# Patient Record
Sex: MALE | Race: WHITE | URBAN - METROPOLITAN AREA
[De-identification: names, ages, dates, MRNs, and addresses within clinical notes are randomized per-mention and may not be internally consistent; named-entity substitution may affect disease eponyms.]

---

## 2019-09-30 ENCOUNTER — OFFICE VISIT (OUTPATIENT)
Dept: PRIMARY CARE CLINIC | Age: 64
End: 2019-09-30
Payer: COMMERCIAL

## 2019-09-30 VITALS
HEART RATE: 60 BPM | SYSTOLIC BLOOD PRESSURE: 132 MMHG | DIASTOLIC BLOOD PRESSURE: 86 MMHG | BODY MASS INDEX: 23.88 KG/M2 | WEIGHT: 181 LBS

## 2019-09-30 DIAGNOSIS — J30.89 ENVIRONMENTAL AND SEASONAL ALLERGIES: Chronic | ICD-10-CM

## 2019-09-30 DIAGNOSIS — Z12.5 SCREENING FOR PROSTATE CANCER: Primary | ICD-10-CM

## 2019-09-30 DIAGNOSIS — E78.00 PURE HYPERCHOLESTEROLEMIA: ICD-10-CM

## 2019-09-30 DIAGNOSIS — Z12.5 SCREENING FOR PROSTATE CANCER: ICD-10-CM

## 2019-09-30 LAB
A/G RATIO: 2.5 (ref 1.1–2.2)
ALBUMIN SERPL-MCNC: 5 G/DL (ref 3.4–5)
ALP BLD-CCNC: 64 U/L (ref 40–129)
ALT SERPL-CCNC: 21 U/L (ref 10–40)
ANION GAP SERPL CALCULATED.3IONS-SCNC: 14 MMOL/L (ref 3–16)
AST SERPL-CCNC: 20 U/L (ref 15–37)
BASOPHILS ABSOLUTE: 0.1 K/UL (ref 0–0.2)
BASOPHILS RELATIVE PERCENT: 1.2 %
BILIRUB SERPL-MCNC: 0.6 MG/DL (ref 0–1)
BUN BLDV-MCNC: 18 MG/DL (ref 7–20)
CALCIUM SERPL-MCNC: 9.4 MG/DL (ref 8.3–10.6)
CHLORIDE BLD-SCNC: 104 MMOL/L (ref 99–110)
CHOLESTEROL, TOTAL: 261 MG/DL (ref 0–199)
CO2: 25 MMOL/L (ref 21–32)
CREAT SERPL-MCNC: 1.1 MG/DL (ref 0.8–1.3)
EOSINOPHILS ABSOLUTE: 0.1 K/UL (ref 0–0.6)
EOSINOPHILS RELATIVE PERCENT: 1.9 %
GFR AFRICAN AMERICAN: >60
GFR NON-AFRICAN AMERICAN: >60
GLOBULIN: 2 G/DL
GLUCOSE BLD-MCNC: 89 MG/DL (ref 70–99)
HCT VFR BLD CALC: 44.9 % (ref 40.5–52.5)
HDLC SERPL-MCNC: 67 MG/DL (ref 40–60)
HEMOGLOBIN: 14.8 G/DL (ref 13.5–17.5)
LDL CHOLESTEROL CALCULATED: 178 MG/DL
LYMPHOCYTES ABSOLUTE: 2.1 K/UL (ref 1–5.1)
LYMPHOCYTES RELATIVE PERCENT: 33 %
MCH RBC QN AUTO: 27.9 PG (ref 26–34)
MCHC RBC AUTO-ENTMCNC: 32.9 G/DL (ref 31–36)
MCV RBC AUTO: 84.8 FL (ref 80–100)
MONOCYTES ABSOLUTE: 0.5 K/UL (ref 0–1.3)
MONOCYTES RELATIVE PERCENT: 7.3 %
NEUTROPHILS ABSOLUTE: 3.6 K/UL (ref 1.7–7.7)
NEUTROPHILS RELATIVE PERCENT: 56.6 %
PDW BLD-RTO: 16.6 % (ref 12.4–15.4)
PLATELET # BLD: 224 K/UL (ref 135–450)
PMV BLD AUTO: 9.1 FL (ref 5–10.5)
POTASSIUM SERPL-SCNC: 4.2 MMOL/L (ref 3.5–5.1)
PROSTATE SPECIFIC ANTIGEN: 0.67 NG/ML (ref 0–4)
RBC # BLD: 5.29 M/UL (ref 4.2–5.9)
SODIUM BLD-SCNC: 143 MMOL/L (ref 136–145)
TOTAL PROTEIN: 7 G/DL (ref 6.4–8.2)
TRIGL SERPL-MCNC: 80 MG/DL (ref 0–150)
TSH SERPL DL<=0.05 MIU/L-ACNC: 2.57 UIU/ML (ref 0.27–4.2)
VLDLC SERPL CALC-MCNC: 16 MG/DL
WBC # BLD: 6.3 K/UL (ref 4–11)

## 2019-09-30 PROCEDURE — 99203 OFFICE O/P NEW LOW 30 MIN: CPT | Performed by: INTERNAL MEDICINE

## 2019-09-30 PROCEDURE — G8420 CALC BMI NORM PARAMETERS: HCPCS | Performed by: INTERNAL MEDICINE

## 2019-09-30 PROCEDURE — 1036F TOBACCO NON-USER: CPT | Performed by: INTERNAL MEDICINE

## 2019-09-30 PROCEDURE — G8427 DOCREV CUR MEDS BY ELIG CLIN: HCPCS | Performed by: INTERNAL MEDICINE

## 2019-09-30 PROCEDURE — 3017F COLORECTAL CA SCREEN DOC REV: CPT | Performed by: INTERNAL MEDICINE

## 2019-09-30 ASSESSMENT — PATIENT HEALTH QUESTIONNAIRE - PHQ9
2. FEELING DOWN, DEPRESSED OR HOPELESS: 0
SUM OF ALL RESPONSES TO PHQ9 QUESTIONS 1 & 2: 0
1. LITTLE INTEREST OR PLEASURE IN DOING THINGS: 0
SUM OF ALL RESPONSES TO PHQ QUESTIONS 1-9: 0
SUM OF ALL RESPONSES TO PHQ QUESTIONS 1-9: 0

## 2019-09-30 NOTE — PROGRESS NOTES
Activity    Alcohol use: Yes     Comment: two drinks a month    Drug use: No    Sexual activity: Not Currently     Partners: Female     Comment:    Lifestyle    Physical activity:     Days per week: Not on file     Minutes per session: Not on file    Stress: Not on file   Relationships    Social connections:     Talks on phone: Not on file     Gets together: Not on file     Attends L[employer] day service: Not on file     Active member of club or organization: Not on file     Attends meetings of clubs or organizations: Not on file     Relationship status: Not on file    Intimate partner violence:     Fear of current or ex partner: Not on file     Emotionally abused: Not on file     Physically abused: Not on file     Forced sexual activity: Not on file   Other Topics Concern    Not on file   Social History Narrative    Not on file       Family History   Problem Relation Age of Onset    Diabetes Mother     Arthritis Mother         spinal stenosis    Heart Disease Mother         pacemaker    Cancer Maternal Grandfather         lung    Cancer Brother         throat, hpv related    Early Death Maternal Uncle     Heart Disease Maternal Uncle     Cancer Paternal Uncle         prostate    Cancer Paternal Cousin         breast        Review of Systems  ROS: No unusual headaches or allergy symptoms or blurred vision. No prolonged cough. No flushing or facial pain or chest pain,dizziness, dyspnea, palpitations, or chest pain on exertion. No syncope. No nausea or vommitting or diarrhea. No jaundice or abdominal pain, change in bowel habits, black or bloody stools. No dysuria or hematuria or frequency of urination. No myalgias or muscle pain. No numbness, weakness, or tingling. No falls, or loss of consciousness. No weight loss or back pain. No falls. No paresthesias. No joint swelling or redness. No joint pain. No recent weight loss.  No focal weakness or sensory deficits or paresthesias, No confusion or

## 2019-10-01 LAB
ESTIMATED AVERAGE GLUCOSE: 111.2 MG/DL
HBA1C MFR BLD: 5.5 %

## 2019-10-01 RX ORDER — ATORVASTATIN CALCIUM 10 MG/1
10 TABLET, FILM COATED ORAL DAILY
Qty: 90 TABLET | Refills: 3 | Status: SHIPPED | OUTPATIENT
Start: 2019-10-01